# Patient Record
(demographics unavailable — no encounter records)

---

## 2024-11-01 NOTE — DISCUSSION/SUMMARY
[Normal Growth] : growth [Normal Development] : development  [No Elimination Concerns] : elimination [Continue Regimen] : feeding [No Skin Concerns] : skin [Normal Sleep Pattern] : sleep [None] : no medical problems [Anticipatory Guidance Given] : Anticipatory guidance addressed as per the history of present illness section [Physical Growth and Development] : physical growth and development [Social and Academic Competence] : social and academic competence [Emotional Well-Being] : emotional well-being [Risk Reduction] : risk reduction [Violence and Injury Prevention] : violence and injury prevention [Influenza] : influenza [No Medications] : ~He/She~ is not on any medications [Patient] : patient [Parent/Guardian] : Parent/Guardian [Full Activity without restrictions including Physical Education & Athletics] : Full Activity without restrictions including Physical Education & Athletics [] : The components of the vaccine(s) to be administered today are listed in the plan of care. The disease(s) for which the vaccine(s) are intended to prevent and the risks have been discussed with the caretaker.  The risks are also included in the appropriate vaccination information statements which have been provided to the patient's caregiver.  The caregiver has given consent to vaccinate. [FreeTextEntry1] : Pranav is a 12yo M p/f 13 year [facility]. Concerns include decreased appetite and trouble with sleep. Pt screened positive on PHQ-2 and PHQ-9, scored 11 of JAHAIRA-7. Pt endorses depressive thoughts and decreased pleasure in doing daily activities. He does not endorse any further desire to self harm or any SI. Given concern for depression/anxiety, will refer patient to psychologist; spoke w/ plan w/ pt and family, agreed to plan, will recieve call from psychologist for further planning. No nutrition or elimination concerns; consumes a healthy diet. Participates in sports and other extracurriculars. No dental concerns. Pt reports sleep issues/sleeping 5-6 hours nightly; reviewed sleep hygiene in detail. No other safety or exposure concerns. Passed cardiac screening, cleared to do sports. PE wnl.  He received his influenza vaccine and COVID booster today.  Will see next at 14 year [facility] or as needed. SDOH domains were screened and scored.

## 2024-11-01 NOTE — PHYSICAL EXAM

## 2024-11-01 NOTE — RISK ASSESSMENT
[3] : 1) Little interest or pleasure doing things for nearly every day (3) [2] : 2) Feeling down, depressed, or hopeless for more than half of the days (2) [PHQ-2 Positive] : PHQ-2 Positive [Nearly Every Day (3)] : 3.) Trouble falling asleep, or sleeping too much? Nearly every day [Several Days (1)] : 8.) Moving or speaking so slowly that other people could have noticed, or the opposite, moving or speaking faster than usual? Several days [1/2 of Days or More (2)] : 9.) Thoughts that you would be off dead or of hurting yourself in some way? Half the days or more [Moderately Severe] : Severity of Depression is Moderately Severe [Very Difficult] : How difficult have these problems made it for you to do your work, take care of things at home, or get along with people? Very difficult [No Increased risk of SCA or SCD] : No Increased risk of SCA or SCD    [PHQ-9 Positive] : PHQ-9 Positive [I have developed a follow-up plan documented below in the note.] : I have developed a follow-up plan documented below in the note. [No] : Patient does not consent to screening. [SCL1Cmaue] : 5 [[id number]] : 18 [Have you ever fainted, passed out or had an unexplained seizure suddenly and without warning, especially during exercise or in response] : Have you ever fainted, passed out or had an unexplained seizure suddenly and without warning, especially during exercise or in response to sudden loud noises such as doorbells, alarm clocks and ringing telephones? No [Have you ever had exercise-related chest pain or shortness of breath?] : Have you ever had exercise-related chest pain or shortness of breath? No [Has anyone in your immediate family (parents, grandparents, siblings) or other more distant relatives (aunts, uncles, cousins)  of heart] : Has anyone in your immediate family (parents, grandparents, siblings) or other more distant relatives (aunts, uncles, cousins)  of heart problems or had an unexpected sudden death before age 50 (This would include unexpected drownings, unexplained car accidents in which the relative was driving or sudden infant death syndrome.)? No [Are you related to anyone with hypertrophic cardiomyopathy or hypertrophic obstructive cardiomyopathy, Marfan syndrome, arrhythmogenic] : Are you related to anyone with hypertrophic cardiomyopathy or hypertrophic obstructive cardiomyopathy, Marfan syndrome, arrhythmogenic right ventricular cardiomyopathy, long QT syndrome, short QT syndrome, Brugada syndrome or catecholaminergic polymorphic ventricular tachycardia, or anyone younger than 50 years with a pacemaker or implantable defibrillator? No

## 2024-11-01 NOTE — HISTORY OF PRESENT ILLNESS
[Mother] : mother [Father] : father [Yes] : Patient goes to dentist yearly [Toothpaste] : Primary Fluoride Source: Toothpaste [Up to date] : Up to date [Eats meals with family] : eats meals with family [Has family members/adults to turn to for help] : has family members/adults to turn to for help [Is permitted and is able to make independent decisions] : Is permitted and is able to make independent decisions [Sleep Concerns] : sleep concerns [Grade: ____] : Grade: [unfilled] [Normal Performance] : normal performance [Normal Behavior/Attention] : normal behavior/attention [Normal Homework] : normal homework [Eats regular meals including adequate fruits and vegetables] : eats regular meals including adequate fruits and vegetables [Drinks non-sweetened liquids] : drinks non-sweetened liquids  [Calcium source] : calcium source [Has concerns about body or appearance] : has concerns about body or appearance [Has friends] : has friends [At least 1 hour of physical activity a day] : at least 1 hour of physical activity a day [Screen time (except homework) less than 2 hours a day] : screen time (except homework) less than 2 hours a day [Has interests/participates in community activities/volunteers] : has interests/participates in community activities/volunteers. [Uses safety belts/safety equipment] : uses safety belts/safety equipment  [Has peer relationships free of violence] : has peer relationships free of violence [No] : Patient has not had sexual intercourse [HIV Screening Declined] : HIV Screening Declined [Has ways to cope with stress] : has ways to cope with stress [Displays self-confidence] : displays self-confidence [Has problems with sleep] : has problems with sleep [Gets depressed, anxious, or irritable/has mood swings] : gets depressed, anxious, or irritable/has mood swings [Has thought about hurting self or considered suicide] : has thought about hurting self or considered suicide [With Teen] : teen [NO] : No [Uses electronic nicotine delivery system] : does not use electronic nicotine delivery system [Uses tobacco] : does not use tobacco [Uses drugs] : does not use drugs  [Drinks alcohol] : does not drink alcohol [FreeTextEntry7] : started playing volleyball with his cousin recently, has been doing well in 8th grade [de-identified] : pt notes he has been sleeping less, has had less of an appetite [de-identified] : sleeps 5-6 hr a night; goes to bed at 11pm-1am, uses phone in bed [de-identified] : likes kickboxing and robotics [de-identified] : Pt has cut self with  on R thigh once approx 2 months ago; states he did it "when I was having bad thoughts". Has negative thoughts "every other day". He thinks "I am not good enough" majority, has decreased energy to do things he enjoys, impacts his sleep. Says he feels "less hungry" when this occurs. Pt states that he "would never hurt myself or try to take my life". States reasons for living include that he loves his family and that he likes playing sports.

## 2024-12-02 NOTE — DISCUSSION/SUMMARY
[FreeTextEntry1] : Mental Health History  Today's Date  12- Patient Date of Birth 2011-05-14 Time Spent  60 minutes This document contains confidential/sensitive information. Please refer to your organizational policy for the definition of "confidential/sensitive" documents. History of past mental health treatment  Have you ever been in mental health treatment in the past?    No Psychotropic Medication History  Have you ever been prescribed a psychotropic medication (i.e. SSRI, benzo, mood stabilizer, anti-psychotic, etc.)?   No Comments Past Suicidality  Previous attempt (ever in lifetime)  None Known  C-SSR Screener (lifetime/recent)  Passive Ideation  Have you wished you were dead or wished you could go to sleep and not wake up?  Yes  In the Last 30 days Unable to Assess Active Ideation  Have you actually had any thoughts of killing yourself?  Yes  In the Last 30 days Unable to Assess Suicidal Behavior  Have you ever done anything, started to do anything, or prepared to do anything to end your life?   No  Firearm Safety  Do you have access to lethal means?  No History of Violence  Have you ever engaged in physically violent behavior towards others or destruction of property?   No Comments Family History  Is there a history of mental health concerns in your family?   No Comments Substance Abuse  JUSTICE History  Have you ever had a problem with alcohol or drugs?     No Comments Treatment History  Did you ever participate in any type JUSTICE treatment? (Inpatient Rehab or Detox, Outpatient Treatment, Medication Assisted Treatment)  No Comments Medical History  Do you suffer from any chronic medical conditions?   No Comments Do you suffer from chronic pain?   No Comments  Social History (Patients under 18)  Legal Guardian Information  Biological Parents  Primary Language  What is the child's primary language?  English  Household Composition  What is the composition of the household?  Parent(s)  Comments Developmental History  Did your child experience any delays in development (i.e. walking, talking, etc.)?  No Comments History of Early Intervention  Did your child receive any early intervention services?  No Comments Current Grade Level  What is the current grade level?   Middle School  Grade 8 History of IEP/Special Education Services  Does your child currently have an IEP/are they receiving special education services?  No Comments (designation, services, etc.) History of school related difficulties  Has your child experienced any problems related to school? None    Comments Trauma  Patient Trauma   None reported Comments CPS Involvement  CPS Involvement Status   None Comments Foster Care Placement  Foster Care Placement Status   None Comments Gender Identity  Patient's Gender Identity  Male  Sexual Orientation  Patient's Sexual Orientation Heterosexual  Other Information SDOH  SDOH Concerns None  Other Information History of Present Illness  Current Concerns/Chief Complaints (3-4 sentences)  Pt. reports anxiety and depression sx Hx of recent self injury  High expectations for grades and performance  Current Symptom screening  a. Depressive symptoms {PHQ-9\} b. SI/Safety c. Anxiety symptoms {JAHAIRA-7\} d. Duyen e. Psychosis f. JUSTICE g. ADHD/Disruptive Behavior  History of Present Illness Summary Pranav is a 13 y.o male He is in 8th grade at [employer]  Good grades, no delays He resides with Mom and Dad, no siblings   Mom describes pt. as "good boy"  She stated he feels pressure to go to [employer] She stated that pt. is often quiet and does not open up to family if something is bothering him  Pt. presented as calm and cooperative initially and later presented as more nervous (rocking back and forth at times) He stated he has friends, denied being bullied He enjoys playing video games Pt. reported being "very nervous most of the time about trying to keep my grades up and keeping up with school work"   Pt. denied alcohol, tobacco or drug use He denied hx of trauma and stated he feels safe at home  Pt. reported a hx of self-injury (cutting) on his leg a few months ago with a  when stressed about school work and keeping up  Pt. reported past passive Si as well as vague, active Si recently. Pt. reported he did not remember what the active SI was about but stated it was as recent as yesterday. He denied ever having intents or plans to end his life.  Protective factors explored: Taoism beliefs, relationships with friends, hope for future Pt. denied any HI but stated he sometimes has thoughts of punching people when he is annoyed. Pt. denied any intents or plans to harm anyone  Pt. reported he once thought he saw someone who wasn't there and randomly "says things that are on my mind and it is kind of weird" . He denied auditory hallucinations. Pt. denied hx of bipolar sx  PHQ 9=19 JAHAIRA 7=13  Pt.  stated he had the urge to cut himself a few days ago and talked to friends and listened to music and the feeling passed Writer reviewed safety plan with pt as well as distress tolerance skills (distractions, listening to music, talking with friends or school guidance counselor). LIFENET #s also provided.  Pt.'s hx of SI and self-injury relayed by writer to his father. Safety plan reviewed.  Father agrees to monitor pt. closely, sanitize home of pills/ sharps, utilize 911/ED with imminent concerns and take pt. to  [facility]. for further assessment tomorrow.   Session conducted by two way audio visual platform, [employer], from pt.'s location in [place], [place] and writer's location from [place], [place]. Pt., his parents and writer present for visit. writer also met with pt. individually.   Session time :60 minutes Diagnosis: Anxiety and depression Makayla Morris, PhD  Initial Care Plan  Specify Problems  Anxiety and depression Suicidal ideation Recent self-injury Academic stress  Specify Interventions  Medication Management Brief Psychotherapy Telepsychiatry Consult Care Coordination Intervention Details  [facility] visit/ tele psych consult for further assessment Coordinate for a higher level of care

## 2025-07-28 NOTE — DISCUSSION/SUMMARY
[FreeTextEntry1] : Here for follow up exam after 2 ER visits in [place] where MOC was not present. Given history and exam, abdominal pain most consistent with viral illness, now resolved. Neck injury initial symptoms severe, but normal course in ER, negative CT, and since then has remained asymptomatic. Normal neurologic exam today. Likely neck contusion. No red flags. Okay to resume normal activity. Call office for return of abdominal / neck pain, vomiting, diarrhea, decreased neck range of motion, voice changes, or any parent/patient concern. Call 911 for trouble breathing.

## 2025-07-28 NOTE — HISTORY OF PRESENT ILLNESS
[FreeTextEntry6] : Here for follow up after two ER visits in [place] when away at [place]  Paperwork reviewed, no attending assessments  7/13 went for abdominal pain, got CBC, CMP, UA, CT pelvis - all negative per patient Abdominal pain began 1 week prior, lower abdominal pain No diagnosis given, afterwards [place] told MOC likely virus as other kids then developed similar pain Resolved on its own No prior pain similar to this No fever, vomiting, diarrhea, decreased PO, dysuria, rashes Now resolved  7/21 went for neck injury during basketball, given toradol, CT neck, negative per patient, diagnosed with contusion Got elbowed in the throat during a game, [place] nurse sent to ER due to trouble breathing and talking  Since then without neck pain, decreased ROM, voice changes, trouble breathing, weakness, fatigue Has played basketball since, able to keep up with others, doesn't feel weak or limited Unsure if he had bruising but currently without No medicines

## 2025-07-28 NOTE — PHYSICAL EXAM
[Supple] : supple [FROM] : full passive range of motion [NL] : warm, clear [de-identified] : Strength 5/5 [de-identified] :  CN II-XII intact, strength 5/5 throughout bilateral UE and LE, sensation to light touch intact throughout, DTR 2+ quads/triceps, no dysmetria, normal Romberg, normal gait, normal tone